# Patient Record
Sex: FEMALE
[De-identification: names, ages, dates, MRNs, and addresses within clinical notes are randomized per-mention and may not be internally consistent; named-entity substitution may affect disease eponyms.]

---

## 2018-02-27 ENCOUNTER — HOSPITAL ENCOUNTER (OUTPATIENT)
Dept: HOSPITAL 14 - H.OPSURG | Age: 43
Discharge: HOME | End: 2018-02-27
Attending: OBSTETRICS & GYNECOLOGY
Payer: COMMERCIAL

## 2018-02-27 VITALS — HEART RATE: 78 BPM | SYSTOLIC BLOOD PRESSURE: 112 MMHG | TEMPERATURE: 97.2 F | DIASTOLIC BLOOD PRESSURE: 76 MMHG

## 2018-02-27 VITALS — OXYGEN SATURATION: 100 %

## 2018-02-27 VITALS — RESPIRATION RATE: 18 BRPM

## 2018-02-27 VITALS — BODY MASS INDEX: 37.9 KG/M2

## 2018-02-27 DIAGNOSIS — E03.9: ICD-10-CM

## 2018-02-27 DIAGNOSIS — E78.5: ICD-10-CM

## 2018-02-27 DIAGNOSIS — E11.9: ICD-10-CM

## 2018-02-27 DIAGNOSIS — N92.0: Primary | ICD-10-CM

## 2018-02-27 DIAGNOSIS — E66.9: ICD-10-CM

## 2018-02-27 LAB
BASOPHILS # BLD AUTO: 0 K/UL (ref 0–0.2)
BASOPHILS NFR BLD: 0.5 % (ref 0–2)
BUN SERPL-MCNC: 13 MG/DL (ref 7–17)
CALCIUM SERPL-MCNC: 9.5 MG/DL (ref 8.4–10.2)
EOSINOPHIL # BLD AUTO: 0.1 K/UL (ref 0–0.7)
EOSINOPHIL NFR BLD: 1.9 % (ref 0–4)
ERYTHROCYTE [DISTWIDTH] IN BLOOD BY AUTOMATED COUNT: 15.2 % (ref 11.5–14.5)
GFR NON-AFRICAN AMERICAN: > 60
HGB BLD-MCNC: 12.1 G/DL (ref 12–16)
LYMPHOCYTES # BLD AUTO: 1.5 K/UL (ref 1–4.3)
LYMPHOCYTES NFR BLD AUTO: 24.7 % (ref 20–40)
MCH RBC QN AUTO: 26 PG (ref 27–31)
MCHC RBC AUTO-ENTMCNC: 33.2 G/DL (ref 33–37)
MCV RBC AUTO: 78.3 FL (ref 81–99)
MONOCYTES # BLD: 0.5 K/UL (ref 0–0.8)
MONOCYTES NFR BLD: 7.5 % (ref 0–10)
NEUTROPHILS # BLD: 4 K/UL (ref 1.8–7)
NEUTROPHILS NFR BLD AUTO: 65.4 % (ref 50–75)
NRBC BLD AUTO-RTO: 0 % (ref 0–0)
PLATELET # BLD: 214 K/UL (ref 130–400)
PMV BLD AUTO: 9.3 FL (ref 7.2–11.7)
RBC # BLD AUTO: 4.67 MIL/UL (ref 3.8–5.2)
WBC # BLD AUTO: 6.1 K/UL (ref 4.8–10.8)

## 2018-02-27 PROCEDURE — 86850 RBC ANTIBODY SCREEN: CPT

## 2018-02-27 PROCEDURE — 80048 BASIC METABOLIC PNL TOTAL CA: CPT

## 2018-02-27 PROCEDURE — 85025 COMPLETE CBC W/AUTO DIFF WBC: CPT

## 2018-02-27 PROCEDURE — 58353 ENDOMETR ABLATE THERMAL: CPT

## 2018-02-27 PROCEDURE — 36415 COLL VENOUS BLD VENIPUNCTURE: CPT

## 2018-02-27 PROCEDURE — 82948 REAGENT STRIP/BLOOD GLUCOSE: CPT

## 2018-02-27 PROCEDURE — 86900 BLOOD TYPING SEROLOGIC ABO: CPT

## 2018-02-27 RX ADMIN — HYDROMORPHONE HYDROCHLORIDE PRN MG: 1 INJECTION, SOLUTION INTRAMUSCULAR; INTRAVENOUS; SUBCUTANEOUS at 12:15

## 2018-02-27 RX ADMIN — HYDROMORPHONE HYDROCHLORIDE PRN MG: 1 INJECTION, SOLUTION INTRAMUSCULAR; INTRAVENOUS; SUBCUTANEOUS at 12:00

## 2018-02-27 NOTE — OP
PROCEDURE DATE:  02/27/2018



PREOPERATIVE DIAGNOSIS:  Menorrhagia.



POSTOPERATIVE DIAGNOSIS:  Menorrhagia.



OPERATION PERFORMED:  Endometrial ablation with NovaSure device.



SURGEON:  Otto Sagastume MD



ANESTHESIOLOGIST:  Erick Dias MD



TYPE OF ANESTHESIA:  General.



OPERATIVE FINDINGS:  Normal appearing pelvic anatomy.



COMPLICATIONS:  None.



ESTIMATED BLOOD LOSS:  Minimal.



FLUIDS:  500 mL Lactate Ringer's.



DESCRIPTION OF PROCEDURE:  The patient was taken to the operating room

where general anesthesia was found to be adequate.  The patient was prepped

and draped in a normal sterile fashion in the dorsal lithotomy position.  A

weighted speculum was placed at the posterior aspect of the vagina.  A

retractor was placed at the anterior surface of the vagina.  The cervix was

grasped with a single tooth tenaculum at the anterior surface.  The cervix

was dilated with Wang dilators to a size of 20-Armenian.  The uterus was

_____ and the uterine cavity was measured as well as the cervical length. 

The NovaSure device was placed through the cervix into the uterine cavity. 

The NovaSure device was opened.  After ensuring appropriate location, the

NovaSure device was activated.  NovaSure device was activated for

approximately 60 seconds.  After completion of ablation, the NovaSure

device was retracted and removed from the endometrial cavity.  The cervix

was removed under direct visualization.  After the procedure, both the

cervical os and tenaculum site were found to be hemostatic.



The patient tolerated the procedure well.  All sponge, lap, and needle

counts were correct x2.  There were no complications.  The patient was

taken to the recovery room, awake and in stable condition.







__________________________________________

Otto Sagastume MD





DD:  02/27/2018 12:12:58

DT:  02/27/2018 12:29:16

Job # [id number]

## 2018-02-27 NOTE — CP.PCM.HP
History of Present Illness





- History of Present Illness


History of Present Illness: 





43yo female with long h/o menorrhagia.  EMB negative and ultrasound 

unremarkable.  Discussed R/B/A of ablation and patient consented for same.  All 

patient questions answered.





Present on Admission





- Present on Admission


Any Indicators Present on Admission: No


History of DVT/PE: No


History of Uncontrolled Diabetes: No


Urinary Catheter: No


Decubitus Ulcer Present: No





Past Patient History





- Past Medical History & Family History


Past Medical History?: Yes





- Past Social History


Smoking Status: Never Smoked





- CARDIAC


Hx Cardiac Disorders: No





- PULMONARY


Hx Respiratory Disorders: No





- NEUROLOGICAL


Hx Neurological Disorder: No





- HEENT


Hx HEENT Problems: No





- RENAL


Hx Chronic Kidney Disease: No





- ENDOCRINE/METABOLIC


Hx Endocrine Disorders: Yes


Hx Diabetes Mellitus Type 2: Yes


Hx Hypothyroidism: Yes





- HEMATOLOGICAL/ONCOLOGICAL


Hx Blood Disorders: No





- INTEGUMENTARY


Hx Dermatological Problems: No





- MUSCULOSKELETAL/RHEUMATOLOGICAL


Hx Musculoskeletal Disorders: No





- GASTROINTESTINAL


Hx Gastrointestinal Disorders: No





- GENITOURINARY/GYNECOLOGICAL


Hx Genitourinary Disorders: No





- PSYCHIATRIC


Hx Psychophysiologic Disorder: No





- SURGICAL HISTORY


Hx Surgeries: Yes


Hx Cholecystectomy: Yes (2017)


Other/Comment: tummy tuck





- ANESTHESIA


Hx Anesthesia: Yes


Hx Anesthesia Reactions: Yes (nausea)


Hx Malignant Hyperthermia: No


Has any member of the family had a problem w/ anesthesia?: No





Meds


Allergies/Adverse Reactions: 


 Allergies











Allergy/AdvReac Type Severity Reaction Status Date / Time


 


No Known Allergies Allergy   Verified 02/27/18 10:41














Physical Exam





- Constitutional


Appears: Well, Non-toxic, No Acute Distress





- Head Exam


Head Exam: ATRAUMATIC





- Eye Exam


Eye Exam: EOMI, Normal appearance





- ENT Exam


ENT Exam: Mucous Membranes Moist





- Neck Exam


Neck exam: Positive for: Full Rom, Normal Inspection





- Respiratory Exam


Respiratory Exam: Clear to Auscultation Bilateral, NORMAL BREATHING PATTERN





- Cardiovascular Exam


Cardiovascular Exam: REGULAR RHYTHM





- GI/Abdominal Exam


GI & Abdominal Exam: Soft.  absent: Distended, Tenderness





- Extremities Exam


Extremities exam: Positive for: full ROM, normal inspection.  Negative for: 

calf tenderness





Results





- Vital Signs


Recent Vital Signs: 





 Last Vital Signs











Temp  97.9 F   02/27/18 10:00


 


Pulse  88   02/27/18 10:00


 


Resp  18   02/27/18 10:00


 


BP  122/58 L  02/27/18 10:00


 


Pulse Ox  98   02/27/18 10:00














- Labs


Result Diagrams: 


 02/27/18 10:20





 02/27/18 10:20


Labs: 





 Laboratory Results - last 24 hr











  02/27/18 02/27/18





  10:20 10:20


 


WBC  6.1 


 


RBC  4.67 


 


Hgb  12.1 


 


Hct  36.5 


 


MCV  78.3 L 


 


MCH  26.0 L 


 


MCHC  33.2 


 


RDW  15.2 H 


 


Plt Count  214 


 


MPV  9.3 


 


Neut % (Auto)  65.4 


 


Lymph % (Auto)  24.7 


 


Mono % (Auto)  7.5 


 


Eos % (Auto)  1.9 


 


Baso % (Auto)  0.5 


 


Neut # (Auto)  4.0 


 


Lymph # (Auto)  1.5 


 


Mono # (Auto)  0.5 


 


Eos # (Auto)  0.1 


 


Baso # (Auto)  0.0 


 


Sodium   141


 


Potassium   4.3


 


Chloride   101


 


Carbon Dioxide   28


 


Anion Gap   16


 


BUN   13


 


Creatinine   0.6 L


 


Est GFR ( Amer)   > 60


 


Est GFR (Non-Af Amer)   > 60


 


Random Glucose   106 H


 


Calcium   9.5














- Imaging and Cardiology


  ** US - abdomen


Status: Report reviewed by me





Assessment & Plan





- Assessment and Plan (Free Text)


Assessment: 





Menorrhagia


Plan: 





Endometrial ablation.  discussed the R/B/A of surgery with patient and all 

patient questions answered.





- Date & Time


Date: 02/27/18


Time: 10:55

## 2018-02-27 NOTE — [id number].[id number]
Surgeon's Initial Post Op Note





- Surgeon's Notes


Surgeon: Tanika


Assistant: N/A


Type of Anesthesia: General LMA


Anesthesia Administered By: Larissa


Pre-Operative Diagnosis: Menorrhagia


Operative Findings: Normal appearing pelvic anatomy


Post-Operative Diagnosis: Same


Operation Performed: Endometrial Ablation (Novasure)


Specimen/Specimens Removed: None


Estimated Blood Loss: EBL {In ML}: 0


Blood Products Given: N/A


Drains Used: No Drains


Post-Op Condition: Good


Date of Surgery/Procedure: 02/27/18


Time of Surgery/Procedure: 11:58